# Patient Record
Sex: FEMALE | Race: BLACK OR AFRICAN AMERICAN | NOT HISPANIC OR LATINO | Employment: OTHER | URBAN - METROPOLITAN AREA
[De-identification: names, ages, dates, MRNs, and addresses within clinical notes are randomized per-mention and may not be internally consistent; named-entity substitution may affect disease eponyms.]

---

## 2018-04-23 ENCOUNTER — HOSPITAL ENCOUNTER (OUTPATIENT)
Facility: HOSPITAL | Age: 48
Discharge: HOME | End: 2018-04-24
Attending: EMERGENCY MEDICINE | Admitting: INTERNAL MEDICINE
Payer: MEDICAID

## 2018-04-23 DIAGNOSIS — D64.9 SYMPTOMATIC ANEMIA: Primary | ICD-10-CM

## 2018-04-23 DIAGNOSIS — I10 ESSENTIAL HYPERTENSION: ICD-10-CM

## 2018-04-23 DIAGNOSIS — D64.9 ANEMIA, UNSPECIFIED TYPE: ICD-10-CM

## 2018-04-23 DIAGNOSIS — D50.0 IRON DEFICIENCY ANEMIA DUE TO CHRONIC BLOOD LOSS: ICD-10-CM

## 2018-04-23 DIAGNOSIS — N92.1 MENORRHAGIA WITH IRREGULAR CYCLE: ICD-10-CM

## 2018-04-23 DIAGNOSIS — R07.9 CHEST PAIN: ICD-10-CM

## 2018-04-23 DIAGNOSIS — I11.9 BENIGN HYPERTENSIVE HEART DISEASE WITHOUT HEART FAILURE: ICD-10-CM

## 2018-04-23 DIAGNOSIS — Z91.148 NON COMPLIANCE W MEDICATION REGIMEN: ICD-10-CM

## 2018-04-23 LAB
ABO + RH BLD: NORMAL
ALBUMIN SERPL BCP-MCNC: 3.5 G/DL
ALP SERPL-CCNC: 69 U/L
ALT SERPL W/O P-5'-P-CCNC: 11 U/L
AMORPH CRY URNS QL MICRO: NORMAL
ANION GAP SERPL CALC-SCNC: 7 MMOL/L
ANISOCYTOSIS BLD QL SMEAR: SLIGHT
APTT BLDCRRT: 23 SEC
AST SERPL-CCNC: 14 U/L
B-HCG UR QL: NEGATIVE
BACTERIA #/AREA URNS HPF: NORMAL /HPF
BASOPHILS # BLD AUTO: 0.02 K/UL
BASOPHILS NFR BLD: 0.3 %
BILIRUB SERPL-MCNC: 0.5 MG/DL
BILIRUB UR QL STRIP: NEGATIVE
BLD GP AB SCN CELLS X3 SERPL QL: NORMAL
BLD PROD TYP BPU: NORMAL
BLOOD UNIT EXPIRATION DATE: NORMAL
BLOOD UNIT TYPE CODE: 5100
BLOOD UNIT TYPE: NORMAL
BNP SERPL-MCNC: 395 PG/ML
BUN SERPL-MCNC: 13 MG/DL
CALCIUM SERPL-MCNC: 9.9 MG/DL
CHLORIDE SERPL-SCNC: 108 MMOL/L
CLARITY UR: CLEAR
CO2 SERPL-SCNC: 26 MMOL/L
CODING SYSTEM: NORMAL
COLOR UR: YELLOW
CREAT SERPL-MCNC: 0.9 MG/DL
CTP QC/QA: YES
DAT IGG-SP REAG RBC-IMP: NORMAL
DIFFERENTIAL METHOD: ABNORMAL
DISPENSE STATUS: NORMAL
EOSINOPHIL # BLD AUTO: 0.1 K/UL
EOSINOPHIL NFR BLD: 1.8 %
ERYTHROCYTE [DISTWIDTH] IN BLOOD BY AUTOMATED COUNT: 19.5 %
EST. GFR  (AFRICAN AMERICAN): >60 ML/MIN/1.73 M^2
EST. GFR  (NON AFRICAN AMERICAN): >60 ML/MIN/1.73 M^2
GLUCOSE SERPL-MCNC: 168 MG/DL
GLUCOSE UR QL STRIP: NEGATIVE
HCT VFR BLD AUTO: 24.3 %
HGB BLD-MCNC: 6.7 G/DL
HGB S BLD QL SOLY: NEGATIVE
HGB UR QL STRIP: NEGATIVE
HYALINE CASTS #/AREA URNS LPF: 0 /LPF
HYPOCHROMIA BLD QL SMEAR: ABNORMAL
IRON SERPL-MCNC: 14 UG/DL
KETONES UR QL STRIP: ABNORMAL
LDH SERPL L TO P-CCNC: 197 U/L
LEUKOCYTE ESTERASE UR QL STRIP: NEGATIVE
LYMPHOCYTES # BLD AUTO: 1.7 K/UL
LYMPHOCYTES NFR BLD: 22.8 %
MCH RBC QN AUTO: 18.6 PG
MCHC RBC AUTO-ENTMCNC: 27.6 G/DL
MCV RBC AUTO: 67 FL
MICROSCOPIC COMMENT: NORMAL
MONOCYTES # BLD AUTO: 0.4 K/UL
MONOCYTES NFR BLD: 5.4 %
NEUTROPHILS # BLD AUTO: 5.2 K/UL
NEUTROPHILS NFR BLD: 69.8 %
NITRITE UR QL STRIP: NEGATIVE
PH UR STRIP: 5 [PH] (ref 5–8)
PLATELET # BLD AUTO: 340 K/UL
PMV BLD AUTO: 10.7 FL
POLYCHROMASIA BLD QL SMEAR: ABNORMAL
POTASSIUM SERPL-SCNC: 3.7 MMOL/L
PROT SERPL-MCNC: 7 G/DL
PROT UR QL STRIP: ABNORMAL
RBC # BLD AUTO: 3.61 M/UL
RBC #/AREA URNS HPF: 1 /HPF (ref 0–4)
RETICS/RBC NFR AUTO: 1.5 %
SATURATED IRON: 3 %
SODIUM SERPL-SCNC: 141 MMOL/L
SP GR UR STRIP: 1.02 (ref 1–1.03)
SQUAMOUS #/AREA URNS HPF: 5 /HPF
TOTAL IRON BINDING CAPACITY: 537 UG/DL
TRANS ERYTHROCYTES VOL PATIENT: NORMAL ML
TRANSFERRIN SERPL-MCNC: 363 MG/DL
TROPONIN I SERPL DL<=0.01 NG/ML-MCNC: 0.01 NG/ML
URN SPEC COLLECT METH UR: ABNORMAL
UROBILINOGEN UR STRIP-ACNC: ABNORMAL EU/DL
WBC # BLD AUTO: 7.4 K/UL
WBC #/AREA URNS HPF: 1 /HPF (ref 0–5)

## 2018-04-23 PROCEDURE — 11000001 HC ACUTE MED/SURG PRIVATE ROOM

## 2018-04-23 PROCEDURE — 81025 URINE PREGNANCY TEST: CPT | Performed by: PHYSICIAN ASSISTANT

## 2018-04-23 PROCEDURE — 84484 ASSAY OF TROPONIN QUANT: CPT

## 2018-04-23 PROCEDURE — G0378 HOSPITAL OBSERVATION PER HR: HCPCS

## 2018-04-23 PROCEDURE — 82607 VITAMIN B-12: CPT

## 2018-04-23 PROCEDURE — 85045 AUTOMATED RETICULOCYTE COUNT: CPT

## 2018-04-23 PROCEDURE — 86920 COMPATIBILITY TEST SPIN: CPT

## 2018-04-23 PROCEDURE — 82746 ASSAY OF FOLIC ACID SERUM: CPT

## 2018-04-23 PROCEDURE — 86880 COOMBS TEST DIRECT: CPT

## 2018-04-23 PROCEDURE — 83880 ASSAY OF NATRIURETIC PEPTIDE: CPT

## 2018-04-23 PROCEDURE — 85730 THROMBOPLASTIN TIME PARTIAL: CPT

## 2018-04-23 PROCEDURE — 83615 LACTATE (LD) (LDH) ENZYME: CPT

## 2018-04-23 PROCEDURE — 36430 TRANSFUSION BLD/BLD COMPNT: CPT

## 2018-04-23 PROCEDURE — 25000003 PHARM REV CODE 250: Performed by: EMERGENCY MEDICINE

## 2018-04-23 PROCEDURE — 85025 COMPLETE CBC W/AUTO DIFF WBC: CPT

## 2018-04-23 PROCEDURE — 81000 URINALYSIS NONAUTO W/SCOPE: CPT

## 2018-04-23 PROCEDURE — 99285 EMERGENCY DEPT VISIT HI MDM: CPT | Mod: 25

## 2018-04-23 PROCEDURE — 83540 ASSAY OF IRON: CPT

## 2018-04-23 PROCEDURE — P9021 RED BLOOD CELLS UNIT: HCPCS

## 2018-04-23 PROCEDURE — 80053 COMPREHEN METABOLIC PANEL: CPT

## 2018-04-23 PROCEDURE — 86850 RBC ANTIBODY SCREEN: CPT

## 2018-04-23 PROCEDURE — 85660 RBC SICKLE CELL TEST: CPT

## 2018-04-23 RX ORDER — ASPIRIN 325 MG
325 TABLET ORAL
Status: COMPLETED | OUTPATIENT
Start: 2018-04-23 | End: 2018-04-23

## 2018-04-23 RX ORDER — HYDROCODONE BITARTRATE AND ACETAMINOPHEN 500; 5 MG/1; MG/1
TABLET ORAL
Status: DISCONTINUED | OUTPATIENT
Start: 2018-04-23 | End: 2018-04-24 | Stop reason: HOSPADM

## 2018-04-23 RX ORDER — AMLODIPINE BESYLATE 5 MG/1
10 TABLET ORAL
Status: COMPLETED | OUTPATIENT
Start: 2018-04-23 | End: 2018-04-23

## 2018-04-23 RX ORDER — VALSARTAN 80 MG/1
80 TABLET ORAL
Status: COMPLETED | OUTPATIENT
Start: 2018-04-23 | End: 2018-04-23

## 2018-04-23 RX ORDER — ONDANSETRON 4 MG/1
4 TABLET, ORALLY DISINTEGRATING ORAL
Status: COMPLETED | OUTPATIENT
Start: 2018-04-23 | End: 2018-04-23

## 2018-04-23 RX ORDER — CYCLOBENZAPRINE HCL 10 MG
10 TABLET ORAL
Status: COMPLETED | OUTPATIENT
Start: 2018-04-23 | End: 2018-04-23

## 2018-04-23 RX ADMIN — ASPIRIN 325 MG ORAL TABLET 325 MG: 325 PILL ORAL at 05:04

## 2018-04-23 RX ADMIN — ONDANSETRON 4 MG: 4 TABLET, ORALLY DISINTEGRATING ORAL at 11:04

## 2018-04-23 RX ADMIN — SODIUM CHLORIDE: 0.9 INJECTION, SOLUTION INTRAVENOUS at 11:04

## 2018-04-23 RX ADMIN — AMLODIPINE BESYLATE 10 MG: 5 TABLET ORAL at 05:04

## 2018-04-23 RX ADMIN — CYCLOBENZAPRINE HYDROCHLORIDE 10 MG: 10 TABLET, FILM COATED ORAL at 05:04

## 2018-04-23 RX ADMIN — VALSARTAN 80 MG: 80 TABLET, FILM COATED ORAL at 11:04

## 2018-04-23 NOTE — ED TRIAGE NOTES
@1715. Pt arrived to ED c/o SOB when walking and talking, sleeping at night. Pt states chest pain started last night, describe as firing sensation, light headache, nausea, no vomiting. Denies abdominal pain, fever, chill.

## 2018-04-23 NOTE — ED PROVIDER NOTES
"Encounter Date: 4/23/2018       History     Chief Complaint   Patient presents with    Chest Pain     " I have pain in the left side of my chest."      48-year-old female with history of hypertension and anemia presents complaining of left-sided chest pain radiating to the left neck and arm beginning last night.  Patient denies worsening pain with exertion but states that it made it difficult for her to tie her shoe laces.  Prompt her to come to the emergency room.  Denies diaphoresis but does report chronic shortness of breath when walking.  She reports no change in this recently.  Patient also reports occasional nausea but reports it is not associated with her chest pain.  Patient reports having to eat a lot of ice and having heavy menstrual cycles.  Patient is hypertensive to 209/100 at triage.  She reports not having blood pressure medications for over a month.          Review of patient's allergies indicates:   Allergen Reactions    Codeine Itching    Lisinopril Nausea And Vomiting    Metoprolol Nausea And Vomiting     Past Medical History:   Diagnosis Date    Hypertension     Migraine headache     Seasonal allergies      Past Surgical History:   Procedure Laterality Date    tubaligation  jamal        History reviewed. No pertinent family history.  Social History   Substance Use Topics    Smoking status: Passive Smoke Exposure - Never Smoker    Smokeless tobacco: Never Used    Alcohol use Yes      Comment: occasionally     Review of Systems   Constitutional: Negative for diaphoresis, fatigue and fever.   HENT: Negative for nosebleeds and trouble swallowing.    Eyes: Negative for visual disturbance.   Respiratory: Positive for shortness of breath. Negative for cough and chest tightness.    Cardiovascular: Positive for chest pain. Negative for palpitations and leg swelling.   Gastrointestinal: Negative for abdominal distention, abdominal pain, anal bleeding and blood in stool.   Genitourinary: Positive " for dysuria, hematuria, menstrual problem and vaginal bleeding.   Musculoskeletal: Positive for myalgias.   Neurological: Negative for dizziness, syncope, weakness, light-headedness, numbness and headaches.   Psychiatric/Behavioral: Negative.        Physical Exam     Initial Vitals [04/23/18 1659]   BP Pulse Resp Temp SpO2   (!) 209/100 94 20 99.1 °F (37.3 °C) 98 %      MAP       136.33         Physical Exam    Nursing note and vitals reviewed.  Constitutional: She appears well-developed and well-nourished. She is not diaphoretic. No distress.   HENT:   Head: Normocephalic and atraumatic.   Mouth/Throat: Oropharynx is clear and moist. No oropharyngeal exudate.   Eyes: EOM are normal. Pupils are equal, round, and reactive to light. No scleral icterus.   Neck: Normal range of motion. Neck supple. No JVD present.   Cardiovascular: Normal rate, regular rhythm and normal heart sounds.   Pulmonary/Chest: Breath sounds normal. No respiratory distress. She has no wheezes. She has no rhonchi. She has no rales.   Abdominal: Soft. She exhibits no distension. There is no tenderness.   Genitourinary: Rectal exam shows guaiac negative stool. Guaiac negative stool.   Genitourinary Comments: Pt's guaiac is negative.   Musculoskeletal: Normal range of motion. She exhibits tenderness. She exhibits no edema.   Reproducible left pectoral tenderness and pain with range of motion.   +left paraspinal cervical and midthoracic spasm, no midline tenderness to palpation   Neurological: She is alert and oriented to person, place, and time.   Skin: Skin is warm and dry.   Psychiatric: She has a normal mood and affect.         ED Course   Procedures  Labs Reviewed   URINALYSIS - Abnormal; Notable for the following:        Result Value    Protein, UA 2+ (*)     Ketones, UA Trace (*)     Urobilinogen, UA 2.0-3.0 (*)     All other components within normal limits   CBC W/ AUTO DIFFERENTIAL - Abnormal; Notable for the following:     RBC 3.61 (*)      Hemoglobin 6.7 (*)     Hematocrit 24.3 (*)     MCV 67 (*)     MCH 18.6 (*)     MCHC 27.6 (*)     RDW 19.5 (*)     All other components within normal limits   COMPREHENSIVE METABOLIC PANEL - Abnormal; Notable for the following:     Glucose 168 (*)     Anion Gap 7 (*)     All other components within normal limits   B-TYPE NATRIURETIC PEPTIDE - Abnormal; Notable for the following:      (*)     All other components within normal limits   TROPONIN I   APTT   URINALYSIS MICROSCOPIC   TROPONIN I   RETICULOCYTES   FOLATE   VITAMIN B12   IRON AND TIBC   LACTATE DEHYDROGENASE   SICKLE CELL SCREEN   POCT URINE PREGNANCY   TYPE & SCREEN   DIRECT ANTIGLOBULIN TEST   PREPARE RBC SOFT     EKG Readings: (Independently Interpreted)   Initial Reading: No STEMI. Heart Rate: 86. Ectopy: No Ectopy. Conduction: Normal. ST Segments: Normal ST Segments. T Waves Flipped: V6, V5, I and AVL.       X-Rays:   Independently Interpreted Readings:   Chest X-Ray: No infiltrates.     Medical Decision Making:   History:   Old Medical Records: I decided to obtain old medical records.  Differential Diagnosis:   Musculoskeletal skeletal pain  Symptomatic hypertension  Symptomatic anemia  ACS  CHF exacerbation  Independently Interpreted Test(s):   I have ordered and independently interpreted X-rays - see prior notes.  I have ordered and independently interpreted EKG Reading(s) - see prior notes  Clinical Tests:   Lab Tests: Ordered and Reviewed  Radiological Study: Ordered and Reviewed  Medical Tests: Ordered and Reviewed  ED Management:  Afebrile in no acute distress at time of history and physical.  She is noted to be hypertensive.  She has no focal neurological deficits.  There is reproducible tenderness and spasm of her chest wall.  Patient reports being noncompliant with blood pressure medications as well as Flexeril because she has run out and has not established care with a primary care physician in [place].  Patient given Flexeril and  dose of amlodipine.  With some improvement in her blood pressure and pain.  Labs reviewed and notable for hemoglobin of 6.7.  Patient reports easy fatigue with exertion and frequent shortness of breath.  Patient denies black or bloody stools and her guaiac is negative.  Patient reports heavy menstruation having 2 menstrual cycles per month.  This is likely the etiology of her anemia.  Patient is to be admitted for symptomatic anemia.  Iron studies sent from the emergency room and patient ordered to be transfused a unit of blood  Other:   I have discussed this case with another health care provider.       <> Summary of the Discussion: I have discussed the patient's presentation and workup with the hospitalist who agrees with placing the patient in the hospital.  I have placed orders for the hospitalist.                         Clinical Impression:   The primary encounter diagnosis was Anemia, unspecified type. A diagnosis of Chest pain was also pertinent to this visit.    Disposition:   Disposition: Admitted  Condition: Stable                        Dominique Rose MD  04/23/18 2016

## 2018-04-23 NOTE — PROGRESS NOTES
Consult received to provide patient with resources for primary care.  SW met withpatient and provided her with a list of community clinics and a flyer for [facility]. SW advised patient that the [facility] offered same day or next day appointments .  SW also advised patient that the [facility] would be moving to a new location near this hospital.   Patient stated that she was going to f/u with Dr. Mitchell in [place] but she would contact [facility] to find out when they are moving to new location.  Discussed that [facility] might be best place to obtain Rxs.  Patient stated that she does have medicaid which covers her Rxs (copays are affordable).    Ghada Odell LMSW, ISAIAH-JAG, [facility]  4/23/2018

## 2018-04-24 VITALS
HEART RATE: 81 BPM | SYSTOLIC BLOOD PRESSURE: 155 MMHG | HEIGHT: 64 IN | TEMPERATURE: 99 F | BODY MASS INDEX: 37.56 KG/M2 | DIASTOLIC BLOOD PRESSURE: 80 MMHG | OXYGEN SATURATION: 99 % | RESPIRATION RATE: 17 BRPM | WEIGHT: 220 LBS

## 2018-04-24 PROBLEM — R07.9 CHEST PAIN: Status: ACTIVE | Noted: 2018-04-24

## 2018-04-24 PROBLEM — I10 ESSENTIAL HYPERTENSION: Status: ACTIVE | Noted: 2018-04-24

## 2018-04-24 PROBLEM — Z91.148 NON COMPLIANCE W MEDICATION REGIMEN: Status: ACTIVE | Noted: 2018-04-24

## 2018-04-24 PROBLEM — I16.0 HYPERTENSIVE URGENCY: Status: ACTIVE | Noted: 2018-04-24

## 2018-04-24 PROBLEM — N92.1 MENORRHAGIA WITH IRREGULAR CYCLE: Status: ACTIVE | Noted: 2018-04-24

## 2018-04-24 PROBLEM — D50.9 IRON DEFICIENCY ANEMIA: Status: ACTIVE | Noted: 2018-04-24

## 2018-04-24 LAB
ANISOCYTOSIS BLD QL SMEAR: SLIGHT
BASOPHILS # BLD AUTO: 0.03 K/UL
BASOPHILS NFR BLD: 0.3 %
DIFFERENTIAL METHOD: ABNORMAL
EOSINOPHIL # BLD AUTO: 0.1 K/UL
EOSINOPHIL NFR BLD: 1.1 %
ERYTHROCYTE [DISTWIDTH] IN BLOOD BY AUTOMATED COUNT: 21.3 %
FOLATE SERPL-MCNC: 7.5 NG/ML
HCT VFR BLD AUTO: 29.9 %
HGB BLD-MCNC: 8.8 G/DL
HYPOCHROMIA BLD QL SMEAR: ABNORMAL
LYMPHOCYTES # BLD AUTO: 0.8 K/UL
LYMPHOCYTES NFR BLD: 7.1 %
MCH RBC QN AUTO: 20.3 PG
MCHC RBC AUTO-ENTMCNC: 29.4 G/DL
MCV RBC AUTO: 69 FL
MONOCYTES # BLD AUTO: 0.3 K/UL
MONOCYTES NFR BLD: 2.9 %
NEUTROPHILS # BLD AUTO: 9.7 K/UL
NEUTROPHILS NFR BLD: 88.6 %
PLATELET # BLD AUTO: 310 K/UL
PLATELET BLD QL SMEAR: ABNORMAL
PMV BLD AUTO: 10.6 FL
POLYCHROMASIA BLD QL SMEAR: ABNORMAL
RBC # BLD AUTO: 4.33 M/UL
TARGETS BLD QL SMEAR: ABNORMAL
TROPONIN I SERPL DL<=0.01 NG/ML-MCNC: 0.02 NG/ML
VIT B12 SERPL-MCNC: 451 PG/ML
WBC # BLD AUTO: 10.92 K/UL

## 2018-04-24 PROCEDURE — 84484 ASSAY OF TROPONIN QUANT: CPT

## 2018-04-24 PROCEDURE — 85025 COMPLETE CBC W/AUTO DIFF WBC: CPT

## 2018-04-24 PROCEDURE — 36415 COLL VENOUS BLD VENIPUNCTURE: CPT

## 2018-04-24 PROCEDURE — 94760 N-INVAS EAR/PLS OXIMETRY 1: CPT

## 2018-04-24 PROCEDURE — 25000003 PHARM REV CODE 250: Performed by: EMERGENCY MEDICINE

## 2018-04-24 PROCEDURE — 25000003 PHARM REV CODE 250: Performed by: HOSPITALIST

## 2018-04-24 PROCEDURE — G0378 HOSPITAL OBSERVATION PER HR: HCPCS

## 2018-04-24 PROCEDURE — 63600175 PHARM REV CODE 636 W HCPCS: Performed by: HOSPITALIST

## 2018-04-24 RX ORDER — DOCUSATE SODIUM 100 MG/1
100 CAPSULE, LIQUID FILLED ORAL DAILY
Status: DISCONTINUED | OUTPATIENT
Start: 2018-04-24 | End: 2018-04-24 | Stop reason: HOSPADM

## 2018-04-24 RX ORDER — HYDROCHLOROTHIAZIDE 25 MG/1
25 TABLET ORAL DAILY
Qty: 30 TABLET | Refills: 0 | Status: SHIPPED | OUTPATIENT
Start: 2018-04-24

## 2018-04-24 RX ORDER — AMLODIPINE BESYLATE 5 MG/1
10 TABLET ORAL DAILY
Status: DISCONTINUED | OUTPATIENT
Start: 2018-04-24 | End: 2018-04-24 | Stop reason: HOSPADM

## 2018-04-24 RX ORDER — HYDROCHLOROTHIAZIDE 25 MG/1
25 TABLET ORAL DAILY
Status: DISCONTINUED | OUTPATIENT
Start: 2018-04-24 | End: 2018-04-24 | Stop reason: HOSPADM

## 2018-04-24 RX ORDER — BUTALBITAL, ACETAMINOPHEN AND CAFFEINE 50; 325; 40 MG/1; MG/1; MG/1
1 TABLET ORAL EVERY 6 HOURS PRN
Status: DISCONTINUED | OUTPATIENT
Start: 2018-04-24 | End: 2018-04-24 | Stop reason: HOSPADM

## 2018-04-24 RX ORDER — FERROUS SULFATE 325(65) MG
325 TABLET, DELAYED RELEASE (ENTERIC COATED) ORAL DAILY
Status: DISCONTINUED | OUTPATIENT
Start: 2018-04-24 | End: 2018-04-24 | Stop reason: HOSPADM

## 2018-04-24 RX ORDER — ALBUTEROL SULFATE 90 UG/1
2 AEROSOL, METERED RESPIRATORY (INHALATION) EVERY 4 HOURS PRN
Qty: 1 INHALER | Refills: 0 | Status: SHIPPED | OUTPATIENT
Start: 2018-04-24 | End: 2019-04-24

## 2018-04-24 RX ORDER — SODIUM CHLORIDE 0.9 % (FLUSH) 0.9 %
5 SYRINGE (ML) INJECTION
Status: DISCONTINUED | OUTPATIENT
Start: 2018-04-24 | End: 2018-04-24 | Stop reason: HOSPADM

## 2018-04-24 RX ORDER — VALSARTAN 80 MG/1
80 TABLET ORAL DAILY
Qty: 30 TABLET | Refills: 0 | Status: SHIPPED | OUTPATIENT
Start: 2018-04-24

## 2018-04-24 RX ORDER — ALBUTEROL SULFATE 90 UG/1
2 AEROSOL, METERED RESPIRATORY (INHALATION) EVERY 4 HOURS PRN
Status: DISCONTINUED | OUTPATIENT
Start: 2018-04-24 | End: 2018-04-24 | Stop reason: HOSPADM

## 2018-04-24 RX ORDER — FERROUS SULFATE 325(65) MG
325 TABLET, DELAYED RELEASE (ENTERIC COATED) ORAL DAILY
Refills: 0 | COMMUNITY
Start: 2018-04-25

## 2018-04-24 RX ORDER — VALSARTAN 80 MG/1
80 TABLET ORAL ONCE
Status: COMPLETED | OUTPATIENT
Start: 2018-04-24 | End: 2018-04-24

## 2018-04-24 RX ORDER — AMLODIPINE BESYLATE 10 MG/1
10 TABLET ORAL DAILY
Qty: 30 TABLET | Refills: 0 | Status: SHIPPED | OUTPATIENT
Start: 2018-04-25 | End: 2019-04-25

## 2018-04-24 RX ORDER — QUETIAPINE FUMARATE 50 MG/1
50 TABLET, FILM COATED ORAL NIGHTLY
Qty: 30 TABLET | Refills: 0 | Status: SHIPPED | OUTPATIENT
Start: 2018-04-24

## 2018-04-24 RX ORDER — CYCLOBENZAPRINE HCL 10 MG
10 TABLET ORAL 3 TIMES DAILY PRN
Status: DISCONTINUED | OUTPATIENT
Start: 2018-04-24 | End: 2018-04-24 | Stop reason: HOSPADM

## 2018-04-24 RX ORDER — QUETIAPINE FUMARATE 25 MG/1
50 TABLET, FILM COATED ORAL NIGHTLY
Status: DISCONTINUED | OUTPATIENT
Start: 2018-04-24 | End: 2018-04-24 | Stop reason: HOSPADM

## 2018-04-24 RX ORDER — VALSARTAN 80 MG/1
80 TABLET ORAL ONCE
Status: DISCONTINUED | OUTPATIENT
Start: 2018-04-24 | End: 2018-04-24

## 2018-04-24 RX ORDER — ACETAMINOPHEN 325 MG/1
650 TABLET ORAL EVERY 6 HOURS PRN
Status: DISCONTINUED | OUTPATIENT
Start: 2018-04-24 | End: 2018-04-24 | Stop reason: HOSPADM

## 2018-04-24 RX ORDER — DOCUSATE SODIUM 100 MG/1
100 CAPSULE, LIQUID FILLED ORAL DAILY
Status: DISCONTINUED | OUTPATIENT
Start: 2018-04-25 | End: 2018-04-24

## 2018-04-24 RX ADMIN — FERROUS SULFATE TAB EC 325 MG (65 MG FE EQUIVALENT) 325 MG: 325 (65 FE) TABLET DELAYED RESPONSE at 09:04

## 2018-04-24 RX ADMIN — VALSARTAN 80 MG: 80 TABLET, FILM COATED ORAL at 02:04

## 2018-04-24 RX ADMIN — QUETIAPINE FUMARATE 50 MG: 25 TABLET ORAL at 12:04

## 2018-04-24 RX ADMIN — IRON SUCROSE 100 MG: 20 INJECTION, SOLUTION INTRAVENOUS at 10:04

## 2018-04-24 RX ADMIN — BUTALBITAL, ACETAMINOPHEN AND CAFFEINE 1 TABLET: 50; 325; 40 TABLET ORAL at 10:04

## 2018-04-24 RX ADMIN — AMLODIPINE BESYLATE 10 MG: 5 TABLET ORAL at 09:04

## 2018-04-24 RX ADMIN — HYDROCHLOROTHIAZIDE 25 MG: 25 TABLET ORAL at 09:04

## 2018-04-24 RX ADMIN — DOCUSATE SODIUM 100 MG: 100 CAPSULE, LIQUID FILLED ORAL at 03:04

## 2018-04-24 NOTE — ASSESSMENT & PLAN NOTE
Due to chronic loss secondary to menorrhagia  S/p transfusion and will give IV Fe  Fe supplementation PO as well  Hemoccult negative and no other reported sources of bleeding

## 2018-04-24 NOTE — NURSING
Call placed to Dr Stokes per patient request.. MD stated that both request would be addressed in office. She may be even seen tomorrow if available.

## 2018-04-24 NOTE — H&P
"[facility] Hospital Medicine  History & Physical    Patient Name: Bekah Knight  MRN: [id number]  Admission Date: 4/23/2018  Attending Physician: Becky Crawley MD   Primary Care Provider: Noe Mitchell MD         Patient information was obtained from patient, past medical records and ER records.     Subjective:     Principal Problem:Symptomatic anemia    Chief Complaint:   Chief Complaint   Patient presents with    Chest Pain     " I have pain in the left side of my chest."         HPI: 49 y/o female with HTN and non-compliance who presented with c/o chest pain that started last night, 7/10 intensity, generalized to chest, no real radiation, no N/V/diaphoresis, worse with exertion and mainly with SOB. She has been out of her blood pressure medications for over a month. She reports heavy menstrual cycles that started this year, with her cycles lasting over 7-10 days, heavy, and often occurring twice in a month. She reports abdominal cramping, denies F/C, denies melena and BRBPR. In the ER, patient was hypertensive to 209/100 at triage and initial cardiac marker was negative with no acute EKG changes. Her H/H on admit was 6.7/24.3, hemoccult negative.    Past Medical History:   Diagnosis Date    Hypertension     Migraine headache     Seasonal allergies        Past Surgical History:   Procedure Laterality Date    tubaligation  jamal          Review of patient's allergies indicates:   Allergen Reactions    Codeine Itching    Lisinopril Nausea And Vomiting    Metoprolol Nausea And Vomiting       No current facility-administered medications on file prior to encounter.      Current Outpatient Prescriptions on File Prior to Encounter   Medication Sig    aspirin (ECOTRIN) 81 MG EC tablet Take 81 mg by mouth once daily.    [DISCONTINUED] albuterol 90 mcg/actuation inhaler Inhale 2 puffs into the lungs every 4 (four) hours as needed for Wheezing or Shortness of Breath (cough).    [DISCONTINUED] " hydrochlorothiazide (HYDRODIURIL) 25 MG tablet Take 1 tablet (25 mg total) by mouth once daily.    [DISCONTINUED] valsartan (DIOVAN) 80 MG tablet Take 1 tablet (80 mg total) by mouth once daily.    ascorbic acid (VITAMIN C) 250 MG tablet Take 250 mg by mouth once daily.    loratadine (CLARITIN) 10 mg tablet Take 10 mg by mouth once daily.    naltrexone-bupropion 8-90 mg TbSR Take 1 tablet by mouth 2 (two) times daily.    [DISCONTINUED] amLODIPine (NORVASC) 10 MG tablet Take 1/2 tablet po QD x4 days then 1 tablet PO QD    [DISCONTINUED] azithromycin (Z-ABDULAZIZ) 250 MG tablet Take 1 tablet (250 mg total) by mouth once daily. Take first 2 tablets together, then 1 every day until finished.    [DISCONTINUED] cyclobenzaprine (FLEXERIL) 10 MG tablet Take 1 tablet (10 mg total) by mouth 3 (three) times daily as needed for Muscle spasms.    [DISCONTINUED] naproxen (NAPROSYN) 500 MG tablet Take 1 tablet (500 mg total) by mouth 2 (two) times daily with meals.    [DISCONTINUED] quetiapine (SEROQUEL) 50 MG tablet Take 50 mg by mouth every evening.     Family History     Problem Relation (Age of Onset)    Hypertension Mother        Social History Main Topics    Smoking status: Passive Smoke Exposure - Never Smoker    Smokeless tobacco: Never Used    Alcohol use Yes      Comment: occasionally    Drug use: No    Sexual activity: Not on file     Review of Systems   Constitutional: Negative for chills and fever.   HENT: Negative for sore throat.    Eyes: Negative for visual disturbance.   Respiratory: Positive for shortness of breath. Negative for cough and chest tightness.    Cardiovascular: Positive for chest pain. Negative for palpitations and leg swelling.   Gastrointestinal: Negative for anal bleeding, blood in stool, diarrhea, nausea and vomiting.   Endocrine: Negative for polyuria.   Genitourinary: Positive for menstrual problem, pelvic pain, vaginal bleeding and vaginal pain. Negative for dysuria.   Musculoskeletal:  Positive for arthralgias.   Skin: Negative for rash.   Neurological: Positive for light-headedness. Negative for syncope.   Psychiatric/Behavioral: Negative for agitation, behavioral problems and confusion.     Objective:     Vital Signs (Most Recent):  Temp: 98.7 °F (37.1 °C) (04/24/18 0820)  Pulse: 75 (04/24/18 0820)  Resp: 18 (04/24/18 0820)  BP: 139/79 (04/24/18 0820)  SpO2: 98 % (04/24/18 0845) Vital Signs (24h Range):  Temp:  [98.2 °F (36.8 °C)-99.4 °F (37.4 °C)] 98.7 °F (37.1 °C)  Pulse:  [75-94] 75  Resp:  [18-20] 18  SpO2:  [94 %-99 %] 98 %  BP: (126-209)/() 139/79     Weight: 99.8 kg (220 lb)  Body mass index is 37.76 kg/m².    Physical Exam   Constitutional: She is oriented to person, place, and time. She appears well-developed and well-nourished. No distress.   HENT:   Head: Normocephalic and atraumatic.   Eyes: EOM are normal. Pupils are equal, round, and reactive to light.   Neck: Normal range of motion. Neck supple.   Cardiovascular: Normal rate and regular rhythm.    Pulmonary/Chest: Effort normal and breath sounds normal.   Abdominal: Soft. Bowel sounds are normal.   Genitourinary: Rectal exam shows guaiac negative stool.   Musculoskeletal: Normal range of motion. She exhibits no edema.   Neurological: She is oriented to person, place, and time.   Skin: Skin is warm and dry. Capillary refill takes less than 2 seconds. She is not diaphoretic.   Psychiatric: She has a normal mood and affect. Her behavior is normal. Thought content normal.         CRANIAL NERVES     CN III, IV, VI   Pupils are equal, round, and reactive to light.  Extraocular motions are normal.        Significant Labs: All pertinent labs within the past 24 hours have been reviewed.    Significant Imaging: I have reviewed and interpreted all pertinent imaging results/findings within the past 24 hours.    Assessment/Plan:     * Symptomatic anemia    Due to chronic blood loss due to menorrhagia  Anemia workup consistent with Fe  anemia and she is hemoccult negative  No reported GI bleeding or any other sources  S/p transfusion of 1 U PRBC with appropriate rise in H/H  All symptoms resolved  Will give IV Fe and start PO Fe supplementation  GYN consulted        Hypertensive urgency    Secondary to non-compliance with medication regimen for over 1 month  Resumed home regimen and BP now controlled  Resolved        Chest pain    Due to symptomatic anemia and uncontrolled HTN secondary to non-compliance  Symptoms all resolved after BP controlled and transfusion  Pt has been ruled out for MI and EKG without any acute changes  No ectopy noted on telemetry  No further cardiac workup needed        Menorrhagia with irregular cycle    Pt with many months with heavy menstrual cycles  GYN consulted        Iron deficiency anemia    Due to chronic loss secondary to menorrhagia  S/p transfusion and will give IV Fe  Fe supplementation PO as well  Hemoccult negative and no other reported sources of bleeding        Non compliance w medication regimen    Counseled on need for compliance with medication regimen        Essential hypertension    Resumed home regimen with good control          VTE Risk Mitigation     None             Becky Crawley MD  Department of Hospital Medicine   [facility]

## 2018-04-24 NOTE — ASSESSMENT & PLAN NOTE
Due to symptomatic anemia and uncontrolled HTN secondary to non-compliance  Symptoms all resolved after BP controlled and transfusion  Pt has been ruled out for MI and EKG without any acute changes  No ectopy noted on telemetry  No further cardiac workup needed

## 2018-04-24 NOTE — PLAN OF CARE
"TN to patient's room to discuss Helping the patient manage care at home.   TN/SW role explained to pt.     "Discharge planning begins on Admission" pamphlet discussed and placed in "My Health Packet" and placed at bedside.     [facility]: [facility]       04/24/18 1438   Discharge Assessment   Assessment Type Discharge Planning Assessment   Confirmed/corrected address and phone number on facesheet? Yes   Assessment information obtained from? Patient   Expected Length of Stay (days) (discharged)   Communicated expected length of stay with patient/caregiver yes   Prior to hospitilization cognitive status: Alert/Oriented   Prior to hospitalization functional status: Independent   Current cognitive status: Alert/Oriented   Current Functional Status: Independent   Lives With alone   Able to Return to Prior Arrangements yes   Is patient able to care for self after discharge? Yes   Who are your caregiver(s) and their phone number(s)? Patient stated that she has no help at home.  She stated, "I am the one who helps everyone else".   Patient's perception of discharge disposition home or selfcare   Readmission Within The Last 30 Days no previous admission in last 30 days   Patient currently being followed by outpatient case management? No   Patient currently receives any other outside agency services? No   Equipment Currently Used at Home none   Do you have any problems affording any of your prescribed medications? No   Is the patient taking medications as prescribed? yes   Does the patient have transportation home? Yes   Transportation Available family or friend will provide   Does the patient receive services at the Coumadin Clinic? No   Discharge Plan A Home   Patient/Family In Agreement With Plan yes         "

## 2018-04-24 NOTE — ASSESSMENT & PLAN NOTE
Secondary to non-compliance with medication regimen for over 1 month  Resumed home regimen and BP now controlled  Resolved

## 2018-04-24 NOTE — HPI
49 y/o female with HTN and non-compliance who presented with c/o chest pain that started last night, 7/10 intensity, generalized to chest, no real radiation, no N/V/diaphoresis, worse with exertion and mainly with SOB. She has been out of her blood pressure medications for over a month. She reports heavy menstrual cycles that started this year, with her cycles lasting over 7-10 days, heavy, and often occurring twice in a month. She reports abdominal cramping, denies F/C, denies melena and BRBPR. In the ER, patient was hypertensive to 209/100 at triage and initial cardiac marker was negative with no acute EKG changes. Her H/H on admit was 6.7/24.3, hemoccult negative.

## 2018-04-24 NOTE — PROGRESS NOTES
[facility]    WRITTEN HEALTHCARE AND DISCHARGE INFORMATION    Follow-up Information     Noe Mitchell MD On 5/3/2018.    Specialty:  Family Medicine  Why:  @12:20pm, for Hospital Follow up  Contact information:  [address] [place] [address]  [phone]             Brittany Stokes MD In 1 week.    Specialty:  Obstetrics and Gynecology  Contact information:  [address]  [place] [address]  [phone]                       Help at Home           [phone]  After discharge for assistance [facility] On Call Nurse Care Line 24/7  Assistance     Things You are responsible For To Manage Your Care At Home:  1.    Getting your prescriptions filled   2.    Taking your medications as directed, DO NOT MISS ANY DOSES!  3.    Going to your follow-up doctor appointment. This is important because it  allow the doctor to monitor your progress and determine if  any changes need to made to your treatment plan.     Thank you for choosing [facility] for your care.  Please answer any calls you may receive from [facility] we want to continue to support you as you manage your healthcare needs. [facility] is happy to have the opportunity to serve you.      Sincerely,  Your [facility] Team,  YOLI Baker, ACM-RN; [facility]  [phone]

## 2018-04-24 NOTE — PROGRESS NOTES
TN completed d/c education for S&S of Anemia. .  Teach back from the pt: 1. Weaknessr,2. Headaches, 3. SOB. TN placed d/c education sheet in blue Healthcare folder.. Help at home probably, Ricardo(cousin).  ..Marcelina Lambert RN, BSN, [facility]  4/24/2018    Patient will accept [facility] services if available..Marcelina Lambert RN, BSN, [facility]  4/24/2018

## 2018-04-24 NOTE — PROGRESS NOTES
TN reviewed what pt is responsible for:     Things that YOU are responsible for to Manage Your Care At Home:  1. Getting your prescriptions filled.  2. Taking you medications as directed. DO NOT MISS ANY DOSES!  3. Going to your follow-up doctor appointments. This is important because it allows the doctor to monitor your progress and to determine if any changes need to be made to your treatment plan.  .Marcelina Lambert RN, BSN, [facility]  4/24/2018

## 2018-04-24 NOTE — SUBJECTIVE & OBJECTIVE
Past Medical History:   Diagnosis Date    Hypertension     Migraine headache     Seasonal allergies        Past Surgical History:   Procedure Laterality Date    tubaligation  jamal          Review of patient's allergies indicates:   Allergen Reactions    Codeine Itching    Lisinopril Nausea And Vomiting    Metoprolol Nausea And Vomiting       No current facility-administered medications on file prior to encounter.      Current Outpatient Prescriptions on File Prior to Encounter   Medication Sig    aspirin (ECOTRIN) 81 MG EC tablet Take 81 mg by mouth once daily.    [DISCONTINUED] albuterol 90 mcg/actuation inhaler Inhale 2 puffs into the lungs every 4 (four) hours as needed for Wheezing or Shortness of Breath (cough).    [DISCONTINUED] hydrochlorothiazide (HYDRODIURIL) 25 MG tablet Take 1 tablet (25 mg total) by mouth once daily.    [DISCONTINUED] valsartan (DIOVAN) 80 MG tablet Take 1 tablet (80 mg total) by mouth once daily.    ascorbic acid (VITAMIN C) 250 MG tablet Take 250 mg by mouth once daily.    loratadine (CLARITIN) 10 mg tablet Take 10 mg by mouth once daily.    naltrexone-bupropion 8-90 mg TbSR Take 1 tablet by mouth 2 (two) times daily.    [DISCONTINUED] amLODIPine (NORVASC) 10 MG tablet Take 1/2 tablet po QD x4 days then 1 tablet PO QD    [DISCONTINUED] azithromycin (Z-ABDULAZIZ) 250 MG tablet Take 1 tablet (250 mg total) by mouth once daily. Take first 2 tablets together, then 1 every day until finished.    [DISCONTINUED] cyclobenzaprine (FLEXERIL) 10 MG tablet Take 1 tablet (10 mg total) by mouth 3 (three) times daily as needed for Muscle spasms.    [DISCONTINUED] naproxen (NAPROSYN) 500 MG tablet Take 1 tablet (500 mg total) by mouth 2 (two) times daily with meals.    [DISCONTINUED] quetiapine (SEROQUEL) 50 MG tablet Take 50 mg by mouth every evening.     Family History     Problem Relation (Age of Onset)    Hypertension Mother        Social History Main Topics    Smoking status: Passive  Smoke Exposure - Never Smoker    Smokeless tobacco: Never Used    Alcohol use Yes      Comment: occasionally    Drug use: No    Sexual activity: Not on file     Review of Systems   Constitutional: Negative for chills and fever.   HENT: Negative for sore throat.    Eyes: Negative for visual disturbance.   Respiratory: Positive for shortness of breath. Negative for cough and chest tightness.    Cardiovascular: Positive for chest pain. Negative for palpitations and leg swelling.   Gastrointestinal: Negative for anal bleeding, blood in stool, diarrhea, nausea and vomiting.   Endocrine: Negative for polyuria.   Genitourinary: Positive for menstrual problem, pelvic pain, vaginal bleeding and vaginal pain. Negative for dysuria.   Musculoskeletal: Positive for arthralgias.   Skin: Negative for rash.   Neurological: Positive for light-headedness. Negative for syncope.   Psychiatric/Behavioral: Negative for agitation, behavioral problems and confusion.     Objective:     Vital Signs (Most Recent):  Temp: 98.7 °F (37.1 °C) (04/24/18 0820)  Pulse: 75 (04/24/18 0820)  Resp: 18 (04/24/18 0820)  BP: 139/79 (04/24/18 0820)  SpO2: 98 % (04/24/18 0845) Vital Signs (24h Range):  Temp:  [98.2 °F (36.8 °C)-99.4 °F (37.4 °C)] 98.7 °F (37.1 °C)  Pulse:  [75-94] 75  Resp:  [18-20] 18  SpO2:  [94 %-99 %] 98 %  BP: (126-209)/() 139/79     Weight: 99.8 kg (220 lb)  Body mass index is 37.76 kg/m².    Physical Exam   Constitutional: She is oriented to person, place, and time. She appears well-developed and well-nourished. No distress.   HENT:   Head: Normocephalic and atraumatic.   Eyes: EOM are normal. Pupils are equal, round, and reactive to light.   Neck: Normal range of motion. Neck supple.   Cardiovascular: Normal rate and regular rhythm.    Pulmonary/Chest: Effort normal and breath sounds normal.   Abdominal: Soft. Bowel sounds are normal.   Genitourinary: Rectal exam shows guaiac negative stool.   Musculoskeletal: Normal range  of motion. She exhibits no edema.   Neurological: She is oriented to person, place, and time.   Skin: Skin is warm and dry. Capillary refill takes less than 2 seconds. She is not diaphoretic.   Psychiatric: She has a normal mood and affect. Her behavior is normal. Thought content normal.         CRANIAL NERVES     CN III, IV, VI   Pupils are equal, round, and reactive to light.  Extraocular motions are normal.        Significant Labs: All pertinent labs within the past 24 hours have been reviewed.    Significant Imaging: I have reviewed and interpreted all pertinent imaging results/findings within the past 24 hours.

## 2018-04-24 NOTE — PLAN OF CARE
04/24/18 1150   Discharge Assessment   Assessment Type Discharge Planning Assessment   TN to patient's room to complete DC needs assessment.  Patient off unit.  TN to follow up.

## 2018-04-24 NOTE — ASSESSMENT & PLAN NOTE
Due to chronic blood loss due to menorrhagia  Anemia workup consistent with Fe anemia and she is hemoccult negative  No reported GI bleeding or any other sources  S/p transfusion of 1 U PRBC with appropriate rise in H/H  All symptoms resolved  Will give IV Fe and start PO Fe supplementation  GYN consulted

## 2018-04-24 NOTE — CONSULTS
48 y.o. presented to ER with chest pain  She reports has had heavy periods and prolapse for several years  Saw a gyn in [place] a few years ago- does not remember if given rx or dx.  Last few months has had longer and more frequent periods and then has had weakness and chest pain  Admitted to medicine service for cardiac w/u and transfusion  Reports no pain now x when bladder full but does have pain with periods and with sex  No bleeding now- just finished period    Past Medical History:   Diagnosis Date    Hypertension     Migraine headache     Seasonal allergies      Past Surgical History:   Procedure Laterality Date    tubaligation  jamal        Ob hx-  x 13    PE  Temp:  [98.2 °F (36.8 °C)-99.4 °F (37.4 °C)] 98.7 °F (37.1 °C)  Pulse:  [75-94] 75  Resp:  [18-20] 18  SpO2:  [94 %-99 %] 98 %  BP: (126-209)/() 139/79  gen wd.wn  cv normal rate  Lungs non labored  abd soft nt/nt no palpable masses  Pelvic deferred    Recent Labs      18   0106   HCT  29.9*       Menorrhagia- u/s ordered  Symptomatic anemia much better after transfusion  Plan per primary service is to d/c home after completed  D/w pt will need to f/u as outpatient and have additional w/u  Ok to d/c home today- will defer management for now and f/u soon

## 2018-04-24 NOTE — PLAN OF CARE
"   04/24/18 1438   Final Note   Assessment Type Final Discharge Note   Discharge Disposition Home   What phone number can be called within the next 1-3 days to see how you are doing after discharge? ([phone])   Hospital Follow Up  Appt(s) scheduled? Yes   Discharge plans and expectations educations in teach back method with documentation complete? Yes   Right Care Referral Info   Post Acute Recommendation No Care   Education:  TN asked patient if she recalls any conversation she had with CLAUDY Lambert, educator.  Patient stated, "Oh yes, get help for headache and SOB".    Follow up appointment scheduled for pt with PCP.  All information (Date, time, location and contact info) placed in AVS and on DC sheet.  Information given and explained to pt, as well as instructed to call 24-48 hours prior to scheduled time if rescheduling needed.  Patient instructed to bring all meds currently taking in their original bottles along with insurance card and picture ID to all appointments.  Patient instructed to request referral to GYN MD.    Nurse, Marianne notified that all CM needs are met.      "

## 2018-04-26 NOTE — DISCHARGE SUMMARY
[facility] Hospital Medicine  Discharge Summary      Patient Name: Bekah Knight  MRN: [id number]  Admission Date: 4/23/2018  Hospital Length of Stay: 1 days  Discharge Date and Time: 4/24/2018  4:11 PM  Attending Physician:  Becky Crawley MD  Discharging Provider: Becky Crawley MD  Primary Care Provider: Noe Mitchell MD      HPI:   47 y/o female with HTN and non-compliance who presented with c/o chest pain that started last night, 7/10 intensity, generalized to chest, no real radiation, no N/V/diaphoresis, worse with exertion and mainly with SOB. She has been out of her blood pressure medications for over a month. She reports heavy menstrual cycles that started this year, with her cycles lasting over 7-10 days, heavy, and often occurring twice in a month. She reports abdominal cramping, denies F/C, denies melena and BRBPR. In the ER, patient was hypertensive to 209/100 at triage and initial cardiac marker was negative with no acute EKG changes. Her H/H on admit was 6.7/24.3, hemoccult negative.    * No surgery found *      Hospital Course:   Ms. Knight was admitted with symptomatic anemia due to menorrhagia resulting in Fe deficiency anemia. She was hemoccult negative and had no other source of blood loss and had normal blood production and no signs of hemolysis. She was transfused 1 unit of PRBC with appropriate rise in H/H and symptom resolution. She was given IV Fe x 1 during her hospital stay and was started PO supplementation prior to discharge. The patient was also seen by Gyn and evaluated with pelvic U/S which was overall unremarkable. She also had HTN urgency due to non-compliance with medication with associated chest pain with the anemia as well. After transfusion and restart of her home regimen of BP medications, her symptoms completely resolved. She was ruled out for MI with negative cardiac markers, had no acute EKG changes, and had no ectopy on telemetry. Pt was educated to take  medications as directed and to follow up as outpatient with GYN to help with her menorrhagia and PCP upon discharge.     Consults:   Consults         Status Ordering Provider     Inpatient consult to Obstetrics / Gynecology  Once     Provider:  MD Isael Leo SELOM Y.        Service: Hospital Medicine    Final Active Diagnoses:    Diagnosis Date Noted POA    PRINCIPAL PROBLEM:  Symptomatic anemia [D64.9] 04/23/2018 Yes    Hypertensive urgency [I16.0] 04/24/2018 Yes    Chest pain [R07.9] 04/24/2018 Yes    Menorrhagia with irregular cycle [N92.1] 04/24/2018 Yes    Iron deficiency anemia [D50.9] 04/24/2018 Yes    Essential hypertension [I10] 04/24/2018 Yes    Non compliance w medication regimen [Z91.14] 04/24/2018 Not Applicable      Problems Resolved During this Admission:    Diagnosis Date Noted Date Resolved POA       Discharged Condition: good    Disposition: Home or Self Care    Follow Up:  Follow-up Information     Noe Mitchell MD On 5/3/2018.    Specialty:  Family Medicine  Why:  @12:20pm, for Hospital Follow up  Contact information:  [address] [facility] [address] [phone]             Brittany Stokes MD In 1 week.    Specialty:  Obstetrics and Gynecology  Contact information:  [address] [facility]  [address] [place] [address] [phone]                 Patient Instructions:     Diet Cardiac     Activity as tolerated       Significant Diagnostic Studies:   Pelvic U/S (4/25/18):  No sonographic abnormality.  The right ovary was not seen, the right adnexa appears normal.    Pending Diagnostic Studies:     None         Medications:  Reconciled Home Medications:      Medication List      START taking these medications    ferrous sulfate 325 (65 FE) MG EC tablet  Take 1 tablet (325 mg total) by mouth once daily.        CHANGE how you take these medications    amLODIPine 10 MG tablet  Commonly known as:  NORVASC  Take 1 tablet (10 mg total) by mouth once daily.  What  changed:  · how much to take  · how to take this  · when to take this  · additional instructions        CONTINUE taking these medications    albuterol 90 mcg/actuation inhaler  Inhale 2 puffs into the lungs every 4 (four) hours as needed for Wheezing or Shortness of Breath (cough).     aspirin 81 MG EC tablet  Commonly known as:  ECOTRIN  Take 81 mg by mouth once daily.     hydroCHLOROthiazide 25 MG tablet  Commonly known as:  HYDRODIURIL  Take 1 tablet (25 mg total) by mouth once daily.     loratadine 10 mg tablet  Commonly known as:  CLARITIN  Take 10 mg by mouth once daily.     naltrexone-bupropion 8-90 mg Tbsr  Take 1 tablet by mouth 2 (two) times daily.     QUEtiapine 50 MG tablet  Commonly known as:  SEROQUEL  Take 1 tablet (50 mg total) by mouth every evening.     valsartan 80 MG tablet  Commonly known as:  DIOVAN  Take 1 tablet (80 mg total) by mouth once daily.     VITAMIN C 250 MG tablet  Generic drug:  ascorbic acid (vitamin C)  Take 250 mg by mouth once daily.        STOP taking these medications    azithromycin 250 MG tablet  Commonly known as:  Z-ABDULAZIZ     cyclobenzaprine 10 MG tablet  Commonly known as:  FLEXERIL     naproxen 500 MG tablet  Commonly known as:  NAPROSYN            Indwelling Lines/Drains at time of discharge:   Lines/Drains/Airways          No matching active lines, drains, or airways          Time spent on the discharge of patient: 40 minutes  Patient was seen and examined on the date of discharge and determined to be suitable for discharge.         Becky Crawley MD  Department of Hospital Medicine  [facility]

## 2018-04-26 NOTE — HOSPITAL COURSE
Ms. Knight was admitted with symptomatic anemia due to menorrhagia resulting in Fe deficiency anemia. She was hemoccult negative and had no other source of blood loss and had normal blood production and no signs of hemolysis. She was transfused 1 unit of PRBC with appropriate rise in H/H and symptom resolution. She was given IV Fe x 1 during her hospital stay and was started PO supplementation prior to discharge. The patient was also seen by Gyn and evaluated with pelvic U/S which was overall unremarkable. She also had HTN urgency due to non-compliance with medication with associated chest pain with the anemia as well. After transfusion and restart of her home regimen of BP medications, her symptoms completely resolved. She was ruled out for MI with negative cardiac markers, had no acute EKG changes, and had no ectopy on telemetry. Pt was educated to take medications as directed and to follow up as outpatient with GYN to help with her menorrhagia and PCP upon discharge.